# Patient Record
Sex: MALE | Race: ASIAN | NOT HISPANIC OR LATINO | Employment: FULL TIME | URBAN - METROPOLITAN AREA
[De-identification: names, ages, dates, MRNs, and addresses within clinical notes are randomized per-mention and may not be internally consistent; named-entity substitution may affect disease eponyms.]

---

## 2021-07-06 ENCOUNTER — OFFICE VISIT (OUTPATIENT)
Dept: URGENT CARE | Facility: MEDICAL CENTER | Age: 31
End: 2021-07-06

## 2021-07-06 VITALS
RESPIRATION RATE: 16 BRPM | BODY MASS INDEX: 19.89 KG/M2 | TEMPERATURE: 98.1 F | SYSTOLIC BLOOD PRESSURE: 115 MMHG | HEIGHT: 74 IN | WEIGHT: 155 LBS | HEART RATE: 72 BPM | OXYGEN SATURATION: 97 % | DIASTOLIC BLOOD PRESSURE: 77 MMHG

## 2021-07-06 DIAGNOSIS — J02.9 SORE THROAT: Primary | ICD-10-CM

## 2021-07-06 LAB — S PYO AG THROAT QL: NEGATIVE

## 2021-07-06 PROCEDURE — 99213 OFFICE O/P EST LOW 20 MIN: CPT | Performed by: PHYSICIAN ASSISTANT

## 2021-07-06 PROCEDURE — 87880 STREP A ASSAY W/OPTIC: CPT | Performed by: PHYSICIAN ASSISTANT

## 2021-07-06 PROCEDURE — 87070 CULTURE OTHR SPECIMN AEROBIC: CPT | Performed by: PHYSICIAN ASSISTANT

## 2021-07-06 RX ORDER — AMOXICILLIN 500 MG/1
500 CAPSULE ORAL EVERY 12 HOURS SCHEDULED
Qty: 20 CAPSULE | Refills: 0 | Status: SHIPPED | OUTPATIENT
Start: 2021-07-06 | End: 2021-07-16

## 2021-07-07 NOTE — PROGRESS NOTES
[facility]        NAME: Rene Mccarthy is a 32 y o  male  : 1990    MRN: [id number]  DATE: 2021  TIME: 10:39 PM    Assessment and Plan   Sore throat [J02 9]  1  Sore throat  POCT rapid strepA   Rapid strep was negative  will send for culture  Patient declined COVID 19 testing  Patient Instructions         Chief Complaint     Chief Complaint   Patient presents with    Sore Throat     Pt here for sore throat x 8 days  History of Present Illness       Patient presents with a sore throat for one week and bloody discharge in throat for three days  Patient reports throat pain continues to get worst  Patient admits headache that comes and goes  Patient denies any chest pain,shortness of breath or fevers  Patient was vaccinated for COVID 19 with Kamar Rosales in 2021  Patient admits history of throat surgery for sleep apnea  Denies any COVID 19 exposures  Review of Systems   Review of Systems   Constitutional: Positive for fatigue  HENT: Positive for sore throat  Respiratory: Negative  Cardiovascular: Negative  Musculoskeletal: Negative  Neurological: Positive for headaches  Psychiatric/Behavioral: Negative  Current Medications     No current outpatient medications on file  Current Allergies     Allergies as of 2021 - Reviewed 2021   Allergen Reaction Noted    Other Other (See Comments) 10/31/2019            The following portions of the patient's history were reviewed and updated as appropriate: allergies, current medications, past family history, past medical history, past social history, past surgical history and problem list      Past Medical History:   Diagnosis Date    Sleep apnea        Past Surgical History:   Procedure Laterality Date    THROAT SURGERY         History reviewed  No pertinent family history  Medications have been verified          Objective   /77   Pulse 72   Temp 98 1 °F (36 7 °C)   Resp 16 Ht 6' 2" (1 88 m)   Wt 70 3 kg (155 lb)   SpO2 97%   BMI 19 90 kg/m²   No LMP for male patient  Physical Exam     Physical Exam  HENT:      Head: Normocephalic  Comments: No pain over frontal or maxillary sinus  Right Ear: Tympanic membrane, ear canal and external ear normal       Left Ear: Tympanic membrane, ear canal and external ear normal       Nose: Nose normal       Mouth/Throat:      Mouth: Mucous membranes are moist       Pharynx: Posterior oropharyngeal erythema present  No oropharyngeal exudate  Eyes:      Extraocular Movements: Extraocular movements intact  Pupils: Pupils are equal, round, and reactive to light  Neck:      Comments: No palpable lymph nodes  Cardiovascular:      Rate and Rhythm: Normal rate and regular rhythm  Heart sounds: Normal heart sounds  Pulmonary:      Effort: Pulmonary effort is normal       Breath sounds: Normal breath sounds  No wheezing  Neurological:      Mental Status: He is alert and oriented to person, place, and time     Psychiatric:         Mood and Affect: Mood normal          Behavior: Behavior normal

## 2021-07-07 NOTE — PATIENT INSTRUCTIONS
Patient was educated on sore throat  Patient was educated on antibiotics prescribed  Patient was told if symptoms get worst go to ED  Patient understood  Sore Throat, Ambulatory Care   GENERAL INFORMATION:   A sore throat  is often caused by a cold or flu virus  A sore throat may also be caused by bacteria such as strep  Other causes include smoking, a runny nose, allergies, or acid reflux  Seek immediate care for the following symptoms:   · Trouble breathing or swallowing because your throat is swollen or sore    · Drooling because it hurts too much to swallow    · A painful lump in your throat that does not go away after 5 days    · A fever higher than 102? F (39?C) or lasts longer than 3 days    · Confusion    · Blood in your throat or ear  Treatment for a sore throat  will depend on the cause how severe it is  A sore throat cause by a virus will go away on its own without treatment  You will need antibiotics if your sore throat is caused by bacteria  Your sore throat should start to feel better within 3 to 5 days for both viral and bacterial infections  Care for your sore throat:   · Gargle with salt water  Mix ¼ teaspoon salt in a glass of warm water and gargle  This may help reduce swelling in your throat  · Take ibuprofen or acetaminophen:  These medicines decrease pain and fever  They are available without a doctor's order  Ask your healthcare provider which medicine is best for you  Ask how much to take and how often to take it  · Drink more liquids  Cold or warm drinks may help soothe your sore throat  Drinking liquids can also help prevent dehydration  · Use a cool-steam humidifier  to help moisten the air in your room and reduce your throat pain  · Use lozenges, ice, soft foods, or popsicles  to soothe your throat  · Rest your throat as much as possible  Try not to use your voice  This may irritate your throat and worsen your symptoms    Follow up with your healthcare provider as directed:  Write down your questions so you remember to ask them during your visits  CARE AGREEMENT:   You have the right to help plan your care  Learn about your health condition and how it may be treated  Discuss treatment options with your caregivers to decide what care you want to receive  You always have the right to refuse treatment  The above information is an  only  It is not intended as medical advice for individual conditions or treatments  Talk to your doctor, nurse or pharmacist before following any medical regimen to see if it is safe and effective for you  © 2014 [address] is for End User's use only and may not be sold, redistributed or otherwise used for commercial purposes  All illustrations and images included in CareNotes® are the copyrighted property of [employer]  or Shashank Delaney

## 2021-07-09 LAB — BACTERIA THROAT CULT: NORMAL

## 2021-07-13 ENCOUNTER — TELEPHONE (OUTPATIENT)
Dept: URGENT CARE | Facility: MEDICAL CENTER | Age: 31
End: 2021-07-13

## 2025-04-15 NOTE — TELEPHONE ENCOUNTER
Throat culture is negative Medical Necessity Information: It is in your best interest to select a reason for this procedure from the list below. All of these items fulfill various CMS LCD requirements except the new and changing color options. Medical Necessity Clause: This procedure was medically necessary because the lesion that was treated was: Lab: [id number] Lab Facility: 0 Detail Level: Detailed Was A Bandage Applied: Yes Size Of Lesion In Cm (Required): 1.2 Depth Of Shave: dermis Biopsy Method: Dermablade Anesthesia Type: 1% lidocaine with epinephrine Hemostasis: Electrocautery Wound Care: Petrolatum Render Path Notes In Note?: No Consent was obtained from the patient. The risks and benefits to therapy were discussed in detail. Specifically, the risks of infection, scarring, bleeding, prolonged wound healing, incomplete removal, allergy to anesthesia, nerve injury and recurrence were addressed. Prior to the procedure, the treatment site was clearly identified and confirmed by the patient. All components of Universal Protocol/PAUSE Rule completed. Post-Care Instructions: I reviewed with the patient in detail post-care instructions. Patient is to keep the biopsy site dry overnight, and then apply bacitracin twice daily until healed. Patient may apply hydrogen peroxide soaks to remove any crusting. Notification Instructions: Patient will be notified of pathology results. However, patient instructed to call the office if not contacted within 2 weeks. Billing Type: Third-Party Bill